# Patient Record
Sex: FEMALE | Employment: STUDENT | URBAN - METROPOLITAN AREA
[De-identification: names, ages, dates, MRNs, and addresses within clinical notes are randomized per-mention and may not be internally consistent; named-entity substitution may affect disease eponyms.]

---

## 2017-05-19 PROBLEM — R48.2 GAIT APRAXIA: Status: ACTIVE | Noted: 2017-05-19

## 2017-05-19 PROBLEM — G35 MULTIPLE SCLEROSIS (HCC): Status: ACTIVE | Noted: 2017-05-19

## 2017-06-05 ENCOUNTER — APPOINTMENT (OUTPATIENT)
Dept: PHYSICAL THERAPY | Age: 22
End: 2017-06-05
Attending: PSYCHIATRY & NEUROLOGY

## 2017-12-14 PROBLEM — G35 MULTIPLE SCLEROSIS (HCC): Status: RESOLVED | Noted: 2017-05-19 | Resolved: 2017-12-14

## 2017-12-29 PROBLEM — R29.3 POSTURAL IMBALANCE: Status: ACTIVE | Noted: 2017-12-29

## 2017-12-29 PROBLEM — Z91.199 NON-COMPLIANCE WITH TREATMENT: Status: ACTIVE | Noted: 2017-12-29

## 2018-01-08 ENCOUNTER — APPOINTMENT (OUTPATIENT)
Dept: PHYSICAL THERAPY | Age: 23
End: 2018-01-08
Attending: PSYCHIATRY & NEUROLOGY
Payer: COMMERCIAL

## 2018-01-09 ENCOUNTER — HOSPITAL ENCOUNTER (OUTPATIENT)
Dept: PHYSICAL THERAPY | Age: 23
Discharge: HOME | End: 2018-01-09
Attending: PSYCHIATRY & NEUROLOGY
Payer: COMMERCIAL

## 2018-01-09 DIAGNOSIS — G35 MULTIPLE SCLEROSIS (HCC): ICD-10-CM

## 2018-01-09 PROCEDURE — 97162 PT EVAL MOD COMPLEX 30 MIN: CPT

## 2018-01-09 NOTE — THERAPY EVALUATION
Raymundo Hand  : 1995  Primary: Mirta Hassan [facility]  Secondary:  [address]  at [facility]  [address] [place], [address] [place] [address], [place] [address] [address] [facility]  [phone]   [phone]        OUTPATIENT PHYSICAL THERAPY:Initial Assessment 2018    ICD-10: Treatment Diagnosis: Muscle weakness (generalized) (M62.81); Other abnormalities of gait and mobility (R26.89); Other disturbances of skin sensation (R20.8)  Precautions/Allergies:   Review of patient's allergies indicates no known allergies. Fall Risk Score: 6 (? 5 = High Risk)  MD Orders: PT eval and mgt MEDICAL/REFERRING DIAGNOSIS:  Multiple sclerosis ([address] Lysbeth Gene   DATE OF ONSET:   REFERRING PHYSICIAN: Shar Dumont MD  RETURN PHYSICIAN APPOINTMENT: unknown  DATE OF PROGRESS NOTE:    RECERTIFICATION DATE: 02     INITIAL ASSESSMENT:  Ms. Mildred De Jesus presents with weakness, sensory loss, incoordination, balance and gait deficit in the presence of an MS diagnosis. Pt will benefit from physical therapy/acquatics to maximize functional ability including any equipments needs assessment. Pt would benefit from  consult for community assistance needs. Pt may benefit from hand controls for driving to further her independence. PROBLEM LIST (Impacting functional limitations):  1. Decreased Strength  2. Decreased ADL/Functional Activities  3. Decreased Transfer Abilities  4. Decreased Ambulation Ability/Technique  5. Decreased Balance  6. Increased Pain  7. Decreased Activity Tolerance  8. Increased Fatigue  9. Decreased Flexibility/Joint Mobility  10. Decreased [place] with Home Exercise Program INTERVENTIONS PLANNED:  1. Balance Exercise  2. Bed Mobility  3. Electrical Stimulation  4. Family Education  5. Gait Training  6. Home Exercise Program (HEP)  7. Manual Therapy  8. Neuromuscular Re-education/Strengthening  9. Range of Motion (ROM)  10. Therapeutic Activites  11.  Therapeutic Exercise/Strengthening  12. Transfer Training  13. orthotic consult   14. W/c assessment when needed   TREATMENT PLAN:  Effective Dates: 1/9/18 TO 4/6/18. Frequency/Duration: 2 times a week for 12 weeks  GOALS: (Goals have been discussed and agreed upon with patient.)  Short-Term Functional Goals: Time Frame: 4 weeks  1. Pt will be independent with range of motion, strength and balance home exercise program.  2. Pt will be able to stand x 2 minutes with good posture - not locking knees in extension entire time. 3. Pt will decrease TUG time x 2 seconds indicating increased strength, balance and gait ability. Discharge Goals: Time Frame: 12 weeks  Pt will show increased range of motion, strength and improved posture to allow for improved safety and ability with all functional mobility. Pt will decrease TUG score by 2 more seconds indicating more normalized gait pattern and decrease risk for falls. Pt will increase Moraes Balance Scale to 46/56 indicating increased balance and decreased risk for falls. Pt will be able to ambulate independent and safe with least restrictive device to test for 6 minute walk test.    Rehabilitation Potential For Stated Goals: Good  Regarding Jeanie Donis's therapy, I certify that the treatment plan above will be carried out by a therapist or under their direction. Thank you for this referral,  Rogelio Dunaway PT     Referring Physician Signature: Lelo Martínez MD              Date                    HISTORY:   GOAL:  \"I want to be able to run. I have tired to run when this first happened. I can do a plank. Be able to walk without assistance. Present Symptoms:    23year old right handed black female diagnosed with MS 11/2015. Noticed abnormalities since 2004. Sensitive to heat and cold. Still getting used to the weather and how it affects it. When it is rainy and really cold I feel like bad rusted wire mostly in my legs.  I don't get too much discofmort in my arms. While dealing with this I am online to go to school. Studying  [email or web address]. Started 2004 feeling bad and have not worked since then. I would work out and then my workout were not coming along like going up the steps was my first initial clue that something was wrong. Cant pick stuff up from the floor especially on steps. Lives with mom Alondra Giles - dresssing . Use a shower chair and then is independent. [address] and has cane. Has a wheelchair. Furniture or wall walk at home. Wheelchair walker or hold someone when out. About 3 falls per month. Pt, mom, sister and nephew. Boyfriend Vivia Olszewski. Helps patient - dating  About 1 year. Mom gone during the day. Microwave food. Decreased memory. Dont really cook on stove anymore because I forgot it one time  Daily routine - make oatmeal, check out classroom and a little work. Pain in my legs or joints get some stinging and cant sleep    Do steps - like go up and down 5 times. PAIN:  Pain with exhuastion or cold or relapse and puts her in the bed.  5/10 overall  Has not told MD about pain. Has had a 1/2 Lortab before and that helped. Baclofen was no good. Cold weather is a set back but mobility is about where it has been. History of Present Injury/Illness (Reason for Referral):    Past Medical History/Comorbidities:   Ms. Brenton Young  has a past medical history of Herpes genitalia; HSV (herpes simplex virus) anogenital infection (12/22/2015); and [facility] Ms. Brenton Young  has no past surgical history on file. Social History/Living Environment:     Two story home. Room upstairs - Hold banister and wall for steps. Prior Level of Function/Work/Activity:  2004 not working.     Current Medications:    Current Outpatient Prescriptions:     VITAMIN B COMPLEX PO, Take  by mouth., Disp: , Rfl:     VITAMIN E ACETATE PO, Take  by mouth., Disp: , Rfl:     amoxicillin (AMOXIL) 500 mg capsule, Take 1 Cap by mouth two (2) times a day., Disp: 20 Cap, Rfl: 0    Brompheniramine-Pseudoeph-DM (BROMFED DM) 2-30-10 mg/5 mL syrup, Take 5 mL by mouth four (4) times daily as needed. , Disp: 200 mL, Rfl: 0    methylPREDNISolone (MEDROL DOSEPACK) 4 mg tablet, Take 1 Tab by mouth Specific Days and Specific Times. , Disp: 1 Dose Pack, Rfl: 0    Wheel Chair cesar, Daily use G35 & R 48.2, Disp: 1 Each, Rfl: 0    AMPYRA 10 mg Tb12, , Disp: , Rfl:     desogestrel-ethinyl estradiol (ENSKYCE) 0.15-0.03 mg tab, Take 1 Tab by mouth daily. , Disp: 3 Dose Pack, Rfl: 4    valACYclovir (VALTREX) 1 gram tablet, Take 1 Tab by mouth daily. , Disp: 30 Tab, Rfl: 11    DISABLED PLACARD (DISABLED PLACARD) DMV, Permanent Handicap Placard DX: Multiple Sclerosis G35, Disp: 1 Each, Rfl: 0   Date Last Reviewed:  2018   Number of Personal Factors/Comorbidities that affect the Plan of Care: 1-2: MODERATE COMPLEXITY   EXAMINATION:   ROM:  UE:  Good flexible. Light touch present through out LEs, but delayed responses. UE good. Decreased proprioception noted with eyes closed  Strength:          UE:  4+ to 5/5. Core fair static - dynamic could not reach and leave LORI  Left LE:    Hip flexion:  occ 3/5 but usually 3-/5. About 1/2 range  Quickly fatigues  Knee ext: 5/5  Hamstrin/5  DF:  Slight decreased range:  3+/5 EV/INV: 5/5  Toes:  1/2 range for extension  Fairly strong in PF    Right:  Hip flexion 1/4 range in sitting  Knee ext: 5/5  Hams:  4/5  DF:  1/2 range  EV:  1/2 range  INV:  5/5  Toes as above  Functional Mobility:         Gait/Ambulation:  Pt typically walks with walls and furniture or someone helping her or her walker. If the distance is longer she uses the wheelchair. Today without assistive device with close supervision and the wall nearby pt ambulates at least 100' with crouched ataxic gait with scissoring, path deviations and arms in mid guard reaching out to catch a fall. Decreased WS in stance over either foot.         Transfers:  Independent with use of hands        Bed Mobility:  independent  Mental Status:          Alert and oriented x 4. Self reported forgetfulness. Slow responses with mildly perceptible slur. Vision:          Pt states she has no deficits. Does get dizzy with turns. Body Structures Involved:  1. Nerves  2. Eyes and Ears  3. Voice/Speech  4. Bones  5. Joints  6. Muscles  7. Ligaments Body Functions Affected:  1. Mental  2. Sensory/Pain  3. Voice and Speech  4. Neuromusculoskeletal  5. Movement Related Activities and Participation Affected:  1. Learning and Applying Knowledge  2. General Tasks and Demands  3. Communication  4. Mobility  5. Self Care  6. Domestic Life  7. Interpersonal Interactions and Relationships  8. Community, Social and [place]   Number of elements that affect the Plan of Care: 4+: HIGH COMPLEXITY   CLINICAL PRESENTATION:   Presentation: Evolving clinical presentation with changing clinical characteristics: MODERATE COMPLEXITY   CLINICAL DECISION MAKING:   Outcome Measure: Tool Used: Moraes Balance Scale  Score:  Initial: 35/56 Most Recent: X/56 (Date: -- )   Interpretation of Score: Each section is scored on a 0-4 scale, 0 representing the patients inability to perform the task and 4 representing independence. The scores of each section are added together for a total score of 56. The higher the patients score, the more independent the patient is. Any score below 45 indicates increased risk for falls. Score 56 55-45 44-34 33-23 22-12 11-1 0   Modifier CH CI CJ CK CL CM CN     Tool Used: Timed Up and Go (TUG)  Score:  Initial: 16.45 seconds Most Recent: X seconds (Date: -- )   Interpretation of Score: The test measures, in seconds, the time taken by an individual to stand up from a standard arm chair (seat height 46 cm [18 in], arm height 65 cm [25.6 in]), walk a distance of 3 meters (118 in, approx 10 ft), turn, walk back to the chair and sit down.   If the individual takes longer than 14 seconds to complete TUG, this indicates risk for falls. Score 7 7.5-10.5 11-14 14.5-17.5 18-21 21.5-24.5 25+   Modifier CH CI CJ CK CL CM CN     Medical Necessity:   · Skilled intervention continues to be required due to weakness, incoordination, balance and gait deficit. .  Reason for Services/Other Comments:  · Patient continues to require skilled intervention due to weakness, incoordination, balance and gait deficit. .   Use of outcome tool(s) and clinical judgement create a POC that gives a: Questionable prediction of patient's progress: MODERATE COMPLEXITY   TREATMENT:   (In addition to Assessment/Re-Assessment sessions the following treatments were rendered)    ASSESSMENT    Treatment/Session Assessment:  See initial assessment above. Patients response to todays treatment session was tolerated well with no medical complications. .  · Post session pain:  NO CHANGE IN PAIN  · Compliance with Program/Exercises: Will assess as treatment progresses. · Recommendations/Intent for next treatment session: \"Next visit will focus on full range, coordiantion, balance and gait exercises. \".   Total Treatment Duration:       Shama Carrasco PT

## 2018-01-09 NOTE — PROGRESS NOTES
Ambulatory/Rehab Services H2 Model Falls Risk Assessment    Risk Factor Pts. ·   Confusion/Disorientation/Impulsivity  []    4 ·   Symptomatic Depression  []   2 ·   Altered Elimination  []   1 ·   Dizziness/Vertigo  []   1 ·   Gender (Male)  []   1 ·   Any administered antiepileptics (anticonvulsants):  []   2 ·   Any administered benzodiazepines:  []   1 ·   Visual Impairment (specify):  []   1 ·   Portable Oxygen Use  []   1 ·   Orthostatic ? BP  []   1 ·   History of Recent Falls (within 3 mos.)  [x]   5     Ability to Rise from Chair (choose one) Pts. ·   Ability to rise in a single movement  []   0 ·   Pushes up, successful in one attempt  [x]   1 ·   Multiple attempts, but successful  []   3 ·   Unable to rise without assistance  []   4   Total: (5 or greater = High Risk) 6     Falls Prevention Plan:   []                Physical Limitations to Exercise (specify):   []                Mobility Assistance Device (type):   []                Exercise/Equipment Adaptation (specify):    ©2010 [facility] Patent #[id number].  Federal Law prohibits the replication, distribution or use without written permission from [facility]

## 2018-01-16 ENCOUNTER — HOSPITAL ENCOUNTER (OUTPATIENT)
Dept: PHYSICAL THERAPY | Age: 23
Discharge: HOME | End: 2018-01-16
Payer: COMMERCIAL

## 2018-01-16 PROCEDURE — 97113 AQUATIC THERAPY/EXERCISES: CPT

## 2018-01-16 NOTE — PROGRESS NOTES
Florinda Chico  : 1995  Primary: [employer]  Secondary:  [address]  at [facility]  [address], [place], [facility]  [phone]   [phone]        OUTPATIENT PHYSICAL THERAPY:Daily Note and Aquatic Note 2018    ICD-10: Treatment Diagnosis: Muscle weakness (generalized) (M62.81); Other abnormalities of gait and mobility (R26.89); Other disturbances of skin sensation (R20.8)  Precautions/Allergies:   Review of patient's allergies indicates no known allergies. Fall Risk Score: 6 (? 5 = High Risk)  MD Orders: PT eval and mgt MEDICAL/REFERRING DIAGNOSIS:  Multiple sclerosis [G35]   DATE OF ONSET:   REFERRING PHYSICIAN: Yinka Paz MD  RETURN PHYSICIAN APPOINTMENT: unknown  DATE OF PROGRESS NOTE:  23  RECERTIFICATION DATE: 99     INITIAL ASSESSMENT:  Ms. Seun Cardenas presents with weakness, sensory loss, incoordination, balance and gait deficit in the presence of an MS diagnosis. Pt will benefit from physical therapy/acquatics to maximize functional ability including any equipments needs assessment. Pt would benefit from  consult for community assistance needs. Pt may benefit from hand controls for driving to further her independence. PROBLEM LIST (Impacting functional limitations):  1. Decreased Strength  2. Decreased ADL/Functional Activities  3. Decreased Transfer Abilities  4. Decreased Ambulation Ability/Technique  5. Decreased Balance  6. Increased Pain  7. Decreased Activity Tolerance  8. Increased Fatigue  9. Decreased Flexibility/Joint Mobility  10. Decreased [place] with Home Exercise Program INTERVENTIONS PLANNED:  1. Balance Exercise  2. Bed Mobility  3. Electrical Stimulation  4. Family Education  5. Gait Training  6. Home Exercise Program (HEP)  7. Manual Therapy  8. Neuromuscular Re-education/Strengthening  9. Range of Motion (ROM)  10. Therapeutic Activites  11.  Therapeutic Exercise/Strengthening  12. Transfer Training  13. orthotic consult   14. W/c assessment when needed   TREATMENT PLAN:  Effective Dates: 1/9/18 TO 4/6/18. Frequency/Duration: 2 times a week for 12 weeks  GOALS: (Goals have been discussed and agreed upon with patient.)  Short-Term Functional Goals: Time Frame: 4 weeks  1. Pt will be independent with range of motion, strength and balance home exercise program.  2. Pt will be able to stand x 2 minutes with good posture - not locking knees in extension entire time. 3. Pt will decrease TUG time x 2 seconds indicating increased strength, balance and gait ability. Discharge Goals: Time Frame: 12 weeks  Pt will show increased range of motion, strength and improved posture to allow for improved safety and ability with all functional mobility. Pt will decrease TUG score by 2 more seconds indicating more normalized gait pattern and decrease risk for falls. Pt will increase Moraes Balance Scale to 46/56 indicating increased balance and decreased risk for falls. Pt will be able to ambulate independent and safe with least restrictive device to test for 6 minute walk test.    Rehabilitation Potential For Stated Goals: Good  Regarding Jeanie Donis's therapy, I certify that the treatment plan above will be carried out by a therapist or under their direction. Thank you for this referral,  Melissa Rees PTA     Referring Physician Signature: Paco Escalera MD              Date                    HISTORY:   GOAL:  \"I want to be able to run. I have tired to run when this first happened. I can do a plank. Be able to walk without assistance. Present Symptoms:    23year old right handed black female diagnosed with MS 11/2015. Noticed abnormalities since 2004. Sensitive to heat and cold. Still getting used to the weather and how it affects it. When it is rainy and really cold I feel like bad rusted wire mostly in my legs.  I don't get too much discofmort in my arms. While dealing with this I am online to go to school. Studying  [employer]. Started 2004 feeling bad and have not worked since then. I would work out and then my workout were not coming along like going up the steps was my first initial clue that something was wrong. Cant pick stuff up from the floor especially on steps. Lives with mom Laina Kraus - dresssing . Use a shower chair and then is independent. Doming[place] Aloe and has cane. Has a wheelchair. Furniture or wall walk at home. Wheelchair walker or hold someone when out. About 3 falls per month. Pt, mom, sister and nephew. Boyfriend Kaylee Lanier. Helps patient - dating  About 1 year. Mom gone during the day. Microwave food. Decreased memory. Dont really cook on stove anymore because I forgot it one time  Daily routine - make oatmeal, check out classroom and a little work. Pain in my legs or joints get some stinging and cant sleep    Do steps - like go up and down 5 times. PAIN:  Pain with exhuastion or cold or relapse and puts her in the bed.  5/10 overall  Has not told MD about pain. Has had a 1/2 Lortab before and that helped. Baclofen was no good. Cold weather is a set back but mobility is about where it has been. History of Present Injury/Illness (Reason for Referral):    Past Medical History/Comorbidities:   Ms. Ilir Mckeon  has a past medical history of Herpes genitalia; HSV (herpes simplex virus) anogenital infection (12/22/2015); and MS (multiple sclerosis) ([facility] Ms. Ilir Mckeon  has no past surgical history on file. Social History/Living Environment:     Two story home. Room upstairs - Hold banister and wall for steps. Prior Level of Function/Work/Activity:  2004 not working. Current Medications:    Current Outpatient Prescriptions:     Cholecalciferol, Vitamin D3, 3,000 unit tab, Take  by mouth., Disp: , Rfl:     metroNIDAZOLE (METROGEL) 0.75 % vaginal gel, Insert 1 Applicator into vagina nightly for 5 days. , Disp: 187.5 mg, Rfl: 0    VITAMIN B COMPLEX PO, Take  by mouth., Disp: , Rfl:     VITAMIN E ACETATE PO, Take  by mouth., Disp: , Rfl:     Wheel Chair cesar, Daily use G35 & R 48.2, Disp: 1 Each, Rfl: 0    AMPYRA 10 mg Tb12, , Disp: , Rfl:     desogestrel-ethinyl estradiol (ENSKYCE) 0.15-0.03 mg tab, Take 1 Tab by mouth daily. , Disp: 3 Dose Pack, Rfl: 4    valACYclovir (VALTREX) 1 gram tablet, Take 1 Tab by mouth daily. , Disp: 30 Tab, Rfl: 11    DISABLED PLACARD (DISABLED PLACARD) DMV, Permanent Handicap Placard DX: Multiple Sclerosis G35, Disp: 1 Each, Rfl: 0   Date Last Reviewed:  2018   Number of Personal Factors/Comorbidities that affect the Plan of Care: 1-2: MODERATE COMPLEXITY   EXAMINATION:   ROM:  UE:  Good flexible. Light touch present through out LEs, but delayed responses. UE good. Decreased proprioception noted with eyes closed  Strength:          UE:  4+ to 5/5. Core fair static - dynamic could not reach and leave LORI  Left LE:    Hip flexion:  occ 3/5 but usually 3-/5. About 1/2 range  Quickly fatigues  Knee ext: 5/5  Hamstrin/5  DF:  Slight decreased range:  3+/5 EV/INV: 5/5  Toes:  1/2 range for extension  Fairly strong in PF    Right:  Hip flexion 1/4 range in sitting  Knee ext: 5/5  Hams:  4/5  DF:  1/2 range  EV:  1/2 range  INV:  5/5  Toes as above  Functional Mobility:         Gait/Ambulation:  Pt typically walks with walls and furniture or someone helping her or her walker. If the distance is longer she uses the wheelchair. Today without assistive device with close supervision and the wall nearby pt ambulates at least 100' with crouched ataxic gait with scissoring, path deviations and arms in mid guard reaching out to catch a fall. Decreased WS in stance over either foot. Transfers:  Independent with use of hands        Bed Mobility:  independent  Mental Status:          Alert and oriented x 4. Self reported forgetfulness. Slow responses with mildly perceptible slur. Vision:          Pt states she has no deficits. Does get dizzy with turns. Body Structures Involved:  1. Nerves  2. Eyes and Ears  3. Voice/Speech  4. Bones  5. Joints  6. Muscles  7. Ligaments Body Functions Affected:  1. Mental  2. Sensory/Pain  3. Voice and Speech  4. Neuromusculoskeletal  5. Movement Related Activities and Participation Affected:  1. Learning and Applying Knowledge  2. General Tasks and Demands  3. Communication  4. Mobility  5. Self Care  6. Domestic Life  7. Interpersonal Interactions and Relationships  8. Community, Social and [place]   Number of elements that affect the Plan of Care: 4+: HIGH COMPLEXITY   CLINICAL PRESENTATION:   Presentation: Evolving clinical presentation with changing clinical characteristics: MODERATE COMPLEXITY   CLINICAL DECISION MAKING:   Outcome Measure: Tool Used: Moraes Balance Scale  Score:  Initial: 35/56 Most Recent: X/56 (Date: -- )   Interpretation of Score: Each section is scored on a 0-4 scale, 0 representing the patients inability to perform the task and 4 representing independence. The scores of each section are added together for a total score of 56. The higher the patients score, the more independent the patient is. Any score below 45 indicates increased risk for falls. Score 56 55-45 44-34 33-23 22-12 11-1 0   Modifier CH CI CJ CK CL CM CN     Tool Used: Timed Up and Go (TUG)  Score:  Initial: 16.45 seconds Most Recent: X seconds (Date: -- )   Interpretation of Score: The test measures, in seconds, the time taken by an individual to stand up from a standard arm chair (seat height 46 cm [18 in], arm height 65 cm [25.6 in]), walk a distance of 3 meters (118 in, approx 10 ft), turn, walk back to the chair and sit down. If the individual takes longer than 14 seconds to complete TUG, this indicates risk for falls.   Score 7 7.5-10.5 11-14 14.5-17.5 18-21 21.5-24.5 25+   Modifier CH CI CJ CK CL CM CN     Medical Necessity:   · Skilled intervention continues to be required due to weakness, incoordination, balance and gait deficit. .  Reason for Services/Other Comments:  · Patient continues to require skilled intervention due to weakness, incoordination, balance and gait deficit. .   Use of outcome tool(s) and clinical judgement create a POC that gives a: Questionable prediction of patient's progress: MODERATE COMPLEXITY   TREATMENT:     Aquatic Therapy (40 minutes): Aquatic treatment performed per flow grid for Decreased muscle strength, Decreased endurance and Decreased static/dynamic balance and reactive control. Cues provided for balance. Assistance by therapist provided for balance and safety.      Aquatic Exercise Log       Date  1-16-18 Date   Date   Date   Date     Activity/ Exercise Parameters Parameters Parameters Parameters Parameters   Walking forward 4 laps with rail        Walking backward 4 laps with rail       Walking sideways 4 laps with rail         Marching 2 laps with rail and HHA         Goose Step 2 laps with rail and HHA         Tip toes 2 laps with rail and HHA         Heels          Lunges        Side step squats        LE Exercises No noodle         Hip Flex/Ext X 10 B         Hip Abd/Add X 10 B         Hip IR/ER          Calf raises X 10 B         Knee Flex X 10 B         Squats          Leg Circles Deeper water x 10 each way each leg         Step Ups        UE Exercises          Squeeze In          Push Down          Pull Down          Bicep/Tricep        Rows/Press outs         Chi Positions          Trunk Rotation        Deep H2O/ Noodles rings         Stabilization          Arms only          Legs only Bicycle - 2 laps       Cross   [place] X 20          Scissors X 20   Combo x 10          Crab walk        Lower abdominal   work  Knees to chest x 10          Cardio          Jogging        Lap   Swimming          Stretches On bench          Hamstrings X 3 B         Heelcords X 3 B         Piriformis X 3 B         Neck Treatment/Session Assessment:  Patient apologized for being so late. She did well with her first aquatic session. She requires cues to remain on task and to stay focused on task. No pain before or after therapy session. She only reported being tired after session. Patients response to todays treatment session was tolerated well with no medical complications. .  · Post session pain:  NO CHANGE IN PAIN  · Compliance with Program/Exercises: Will assess as treatment progresses. · Recommendations/Intent for next treatment session: \"Next visit will focus on full range, coordiantion, balance and gait exercises. \".   Total Treatment Duration:  PT Patient Time In/Time Out  Time In: 0825 (Patient 25 minutes late)  Time Out: [address], PTA

## 2018-01-17 ENCOUNTER — HOSPITAL ENCOUNTER (OUTPATIENT)
Dept: MRI IMAGING | Age: 23
Discharge: HOME | End: 2018-01-17
Attending: PSYCHIATRY & NEUROLOGY

## 2018-01-18 ENCOUNTER — HOSPITAL ENCOUNTER (OUTPATIENT)
Dept: PHYSICAL THERAPY | Age: 23
Discharge: HOME | End: 2018-01-18
Attending: PSYCHIATRY & NEUROLOGY
Payer: COMMERCIAL

## 2018-01-18 NOTE — PROGRESS NOTES
Percilla Dancer  : 1995  Primary: [facility]  Secondary:  [address]  at [facility], [address], [address]  [phone]   [phone]      2018  Appointment cancelled 2* to inclement weather.   Amarilis Leigh, PT

## 2018-01-23 ENCOUNTER — HOSPITAL ENCOUNTER (OUTPATIENT)
Dept: PHYSICAL THERAPY | Age: 23
Discharge: HOME | End: 2018-01-23
Payer: COMMERCIAL

## 2018-01-23 PROCEDURE — 97113 AQUATIC THERAPY/EXERCISES: CPT

## 2018-01-23 NOTE — PROGRESS NOTES
Herson Meyer  : 1995  Primary: [facility]  Secondary:  [address]  at [facility]  [address] [facility], [address] [place] [address] Drive, [place], [facility]  [phone]   [phone]        OUTPATIENT PHYSICAL THERAPY:Daily Note and Aquatic Note 2018    ICD-10: Treatment Diagnosis: Muscle weakness (generalized) (M62.81); Other abnormalities of gait and mobility (R26.89); Other disturbances of skin sensation (R20.8)  Precautions/Allergies:   Review of patient's allergies indicates no known allergies. Fall Risk Score: 6 (? 5 = High Risk)  MD Orders: PT eval and mgt MEDICAL/REFERRING DIAGNOSIS:  Multiple sclerosis [G35]   DATE OF ONSET:   REFERRING PHYSICIAN: River Bahena MD  RETURN PHYSICIAN APPOINTMENT: unknown  DATE OF PROGRESS NOTE:  69  RECERTIFICATION DATE: 3/2/32     INITIAL ASSESSMENT:  Ms. Jacqueline Millan presents with weakness, sensory loss, incoordination, balance and gait deficit in the presence of an MS diagnosis. Pt will benefit from physical therapy/acquatics to maximize functional ability including any equipments needs assessment. Pt would benefit from  consult for community assistance needs. Pt may benefit from hand controls for driving to further her independence. PROBLEM LIST (Impacting functional limitations):  1. Decreased Strength  2. Decreased ADL/Functional Activities  3. Decreased Transfer Abilities  4. Decreased Ambulation Ability/Technique  5. Decreased Balance  6. Increased Pain  7. Decreased Activity Tolerance  8. Increased Fatigue  9. Decreased Flexibility/Joint Mobility  10. Decreased [place] with Home Exercise Program INTERVENTIONS PLANNED:  1. Balance Exercise  2. Bed Mobility  3. Electrical Stimulation  4. Family Education  5. Gait Training  6. Home Exercise Program (HEP)  7. Manual Therapy  8. Neuromuscular Re-education/Strengthening  9. Range of Motion (ROM)  10. Therapeutic Activites  11.  Therapeutic Exercise/Strengthening  12. Transfer Training  13. orthotic consult   14. W/c assessment when needed   TREATMENT PLAN:  Effective Dates: 1/9/18 TO 4/6/18. Frequency/Duration: 2 times a week for 12 weeks  GOALS: (Goals have been discussed and agreed upon with patient.)  Short-Term Functional Goals: Time Frame: 4 weeks  1. Pt will be independent with range of motion, strength and balance home exercise program.  2. Pt will be able to stand x 2 minutes with good posture - not locking knees in extension entire time. 3. Pt will decrease TUG time x 2 seconds indicating increased strength, balance and gait ability. Discharge Goals: Time Frame: 12 weeks  Pt will show increased range of motion, strength and improved posture to allow for improved safety and ability with all functional mobility. Pt will decrease TUG score by 2 more seconds indicating more normalized gait pattern and decrease risk for falls. Pt will increase Moraes Balance Scale to 46/56 indicating increased balance and decreased risk for falls. Pt will be able to ambulate independent and safe with least restrictive device to test for 6 minute walk test.    Rehabilitation Potential For Stated Goals: Good  Regarding Jeanie Donis's therapy, I certify that the treatment plan above will be carried out by a therapist or under their direction. Thank you for this referral,  Rodríguez Gunn PTA     Referring Physician Signature: Lennox Rink, MD              Date                    HISTORY:   GOAL:  \"I want to be able to run. I have tired to run when this first happened. I can do a plank. Be able to walk without assistance. Present Symptoms:    23year old right handed black female diagnosed with MS 11/2015. Noticed abnormalities since 2004. Sensitive to heat and cold. Still getting used to the weather and how it affects it. When it is rainy and really cold I feel like bad rusted wire mostly in my legs.  I don't get too much discofmort in my arms. While dealing with this I am online to go to school. Studying  [employer]. Started 2004 feeling bad and have not worked since then. I would work out and then my workout were not coming along like going up the steps was my first initial clue that something was wrong. Cant pick stuff up from the floor especially on steps. Lives with mom Norma Cant - dresssing . Use a shower chair and then is independent. [employer] and has cane. Has a wheelchair. Furniture or wall walk at home. Wheelchair walker or hold someone when out. About 3 falls per month. Pt, mom, sister and nephew. Boyfriend Lynsey Garcia. Helps patient - dating  About 1 year. Mom gone during the day. Microwave food. Decreased memory. Dont really cook on stove anymore because I forgot it one time  Daily routine - make oatmeal, check out classroom and a little work. Pain in my legs or joints get some stinging and cant sleep    Do steps - like go up and down 5 times. PAIN:  Pain with exhuastion or cold or relapse and puts her in the bed.  5/10 overall  Has not told MD about pain. Has had a 1/2 Lortab before and that helped. Baclofen was no good. Cold weather is a set back but mobility is about where it has been. History of Present Injury/Illness (Reason for Referral):    Past Medical History/Comorbidities:   Ms. Evaristo Chang  has a past medical history of Herpes genitalia; HSV (herpes simplex virus) anogenital infection (12/22/2015); and [facility] Ms. Evaristo Chang  has no past surgical history on file. Social History/Living Environment:     Two story home. Room upstairs - Hold banister and wall for steps. Prior Level of Function/Work/Activity:  2004 not working.     Current Medications:    Current Outpatient Prescriptions:     Cholecalciferol, Vitamin D3, 3,000 unit tab, Take  by mouth., Disp: , Rfl:     VITAMIN B COMPLEX PO, Take  by mouth., Disp: , Rfl:     VITAMIN E ACETATE PO, Take  by mouth., Disp: , Rfl:     Wheel Chair cesar, Daily use G35 & R 48.2, Disp: 1 Each, Rfl: 0    AMPYRA 10 mg Tb12, , Disp: , Rfl:     desogestrel-ethinyl estradiol (ENSKYCE) 0.15-0.03 mg tab, Take 1 Tab by mouth daily. , Disp: 3 Dose Pack, Rfl: 4    valACYclovir (VALTREX) 1 gram tablet, Take 1 Tab by mouth daily. , Disp: 30 Tab, Rfl: 11    DISABLED PLACARD (DISABLED PLACARD) DMV, Permanent Handicap Placard DX: Multiple Sclerosis G35, Disp: 1 Each, Rfl: 0   Date Last Reviewed:  2018   Number of Personal Factors/Comorbidities that affect the Plan of Care: 1-2: MODERATE COMPLEXITY   EXAMINATION:   ROM:  UE:  Good flexible. Light touch present through out LEs, but delayed responses. UE good. Decreased proprioception noted with eyes closed  Strength:          UE:  4+ to 5/5. Core fair static - dynamic could not reach and leave LORI  Left LE:    Hip flexion:  occ 3/5 but usually 3-/5. About 1/2 range  Quickly fatigues  Knee ext: 5/5  Hamstrin/5  DF:  Slight decreased range:  3+/5 EV/INV: 5/5  Toes:  1/2 range for extension  Fairly strong in PF    Right:  Hip flexion 1/4 range in sitting  Knee ext: 5/5  Hams:  4/5  DF:  1/2 range  EV:  1/2 range  INV:  5/5  Toes as above  Functional Mobility:         Gait/Ambulation:  Pt typically walks with walls and furniture or someone helping her or her walker. If the distance is longer she uses the wheelchair. Today without assistive device with close supervision and the wall nearby pt ambulates at least 100' with crouched ataxic gait with scissoring, path deviations and arms in mid guard reaching out to catch a fall. Decreased WS in stance over either foot. Transfers:  Independent with use of hands        Bed Mobility:  independent  Mental Status:          Alert and oriented x 4. Self reported forgetfulness. Slow responses with mildly perceptible slur. Vision:          Pt states she has no deficits. Does get dizzy with turns. Body Structures Involved:  1. Nerves  2.  Eyes and Ears  3. Voice/Speech  4. Bones  5. Joints  6. Muscles  7. Ligaments Body Functions Affected:  1. Mental  2. Sensory/Pain  3. Voice and Speech  4. Neuromusculoskeletal  5. Movement Related Activities and Participation Affected:  1. Learning and Applying Knowledge  2. General Tasks and Demands  3. Communication  4. Mobility  5. Self Care  6. Domestic Life  7. Interpersonal Interactions and Relationships  8. Community, Social and [place]   Number of elements that affect the Plan of Care: 4+: HIGH COMPLEXITY   CLINICAL PRESENTATION:   Presentation: Evolving clinical presentation with changing clinical characteristics: MODERATE COMPLEXITY   CLINICAL DECISION MAKING:   Outcome Measure: Tool Used: Moraes Balance Scale  Score:  Initial: 35/56 Most Recent: X/56 (Date: -- )   Interpretation of Score: Each section is scored on a 0-4 scale, 0 representing the patients inability to perform the task and 4 representing independence. The scores of each section are added together for a total score of 56. The higher the patients score, the more independent the patient is. Any score below 45 indicates increased risk for falls. Score 56 55-45 44-34 33-23 22-12 11-1 0   Modifier CH CI CJ CK CL CM CN     Tool Used: Timed Up and Go (TUG)  Score:  Initial: 16.45 seconds Most Recent: X seconds (Date: -- )   Interpretation of Score: The test measures, in seconds, the time taken by an individual to stand up from a standard arm chair (seat height 46 cm [18 in], arm height 65 cm [25.6 in]), walk a distance of 3 meters (118 in, approx 10 ft), turn, walk back to the chair and sit down. If the individual takes longer than 14 seconds to complete TUG, this indicates risk for falls. Score 7 7.5-10.5 11-14 14.5-17.5 18-21 21.5-24.5 25+   Modifier CH CI CJ CK CL CM CN     Medical Necessity:   · Skilled intervention continues to be required due to weakness, incoordination, balance and gait deficit. .  Reason for Services/Other Comments:  · Patient continues to require skilled intervention due to weakness, incoordination, balance and gait deficit. .   Use of outcome tool(s) and clinical judgement create a POC that gives a: Questionable prediction of patient's progress: MODERATE COMPLEXITY   TREATMENT:  S: Patient reports feeling good and strong after her last session. She reports she was able to walk better and felt more stable afterwards. No pain or fatigue is reported. Aquatic Therapy (55 minutes): Aquatic treatment performed per flow grid for Decreased muscle strength, Decreased endurance and Decreased static/dynamic balance and reactive control. Cues provided for balance. Assistance by therapist provided for balance and safety.      Aquatic Exercise Log       Date  1-16-18 Date  1-23-18 Date   Date   Date     Activity/ Exercise Parameters Parameters Parameters Parameters Parameters   Walking forward 4 laps with rail  4 laps with rail      Walking backward 4 laps with rail 4 laps with rail      Walking sideways 4 laps with rail 4 laps with rail        Marching 2 laps with rail and HHA 4 laps with rail         Goose Step 2 laps with rail and HHA 2 laps with rail         Tip toes 2 laps with rail and HHA         Heels          Lunges        Side step squats        LE Exercises No noodle         Hip Flex/Ext X 10 B         Hip Abd/Add X 10 B         Hip IR/ER          Calf raises X 10 B         Knee Flex X 10 B         Squats  With bungee belt pulling in all directions x 10 with emphasis on core and stability  (light pull)        Leg Circles Deeper water x 10 each way each leg         Step Ups  X 10 small step B   X 5 big step B       UE Exercises          Squeeze In          Push Down          Pull Down          Bicep/Tricep        Rows/Press outs         Chi Positions          Trunk Rotation        Deep H2O/ Noodles rings rings        Stabilization  Core stability        Arms only          Legs only Bicycle - 2 laps Bicycle - Also seated on noodle in shallow - 4 laps forward and [place] X 20  X 20         Scissors X 20   Combo x 10  X 20         Crab walk  While seated on noodle in shallow - 4 laps      Lower abdominal   work  Knees to chest x 10  Knees to chest x 10         Cardio          Jogging        Lap   Swimming          Stretches On bench  Standing at wall        Hamstrings X 3 B X 3 B        Heelcords X 3 B X 3 B        Piriformis X 3 B         Neck                        Treatment/Session Assessment:  Patient worked hard today. Focus today on core stability, strength, and posture/ alignment. She requires verbal cues for proper alignment with activities throughout due to loosing her posture or as she states gets lazy. Patient more focused today. No pain before or after therapy session. Did assist patient to changing room at end of session with CGA due to balance as exiting to pool today. Patients response to [facility] treatment session was tolerated well with no medical complications. .  · Post session pain:  NO CHANGE IN PAIN  · Compliance with Program/Exercises: Will assess as treatment progresses. · Recommendations/Intent for next treatment session: \"Next visit will focus on full range, coordiantion, balance and gait exercises. \".   Total Treatment Duration:  PT Patient Time In/Time Out  Time In: 0805 (patient 5 minutes late)  Time Out: 0900    Vee Minor PTA

## 2018-01-30 ENCOUNTER — HOSPITAL ENCOUNTER (OUTPATIENT)
Dept: PHYSICAL THERAPY | Age: 23
Discharge: HOME | End: 2018-01-30
Attending: PSYCHIATRY & NEUROLOGY
Payer: COMMERCIAL

## 2018-01-30 ENCOUNTER — HOSPITAL ENCOUNTER (OUTPATIENT)
Dept: MRI IMAGING | Age: 23
Discharge: HOME | End: 2018-01-30
Attending: PSYCHIATRY & NEUROLOGY
Payer: COMMERCIAL

## 2018-01-30 VITALS — WEIGHT: 156 LBS | BODY MASS INDEX: 24.43 KG/M2

## 2018-01-30 DIAGNOSIS — G35 MULTIPLE SCLEROSIS (HCC): ICD-10-CM

## 2018-01-30 PROCEDURE — 74011250636 HC RX REV CODE- 250/636: Performed by: PSYCHIATRY & NEUROLOGY

## 2018-01-30 PROCEDURE — 70553 MRI BRAIN STEM W/O & W/DYE: CPT

## 2018-01-30 PROCEDURE — 97530 THERAPEUTIC ACTIVITIES: CPT

## 2018-01-30 PROCEDURE — A9577 INJ MULTIHANCE: HCPCS | Performed by: PSYCHIATRY & NEUROLOGY

## 2018-01-30 RX ORDER — SODIUM CHLORIDE 0.9 % (FLUSH) 0.9 %
10 SYRINGE (ML) INJECTION
Status: COMPLETED | OUTPATIENT
Start: 2018-01-30 | End: 2018-01-30

## 2018-01-30 RX ADMIN — GADOBENATE DIMEGLUMINE 14 ML: 529 INJECTION, SOLUTION INTRAVENOUS at 19:34

## 2018-01-30 RX ADMIN — Medication 10 ML: at 19:34

## 2018-01-30 NOTE — PROGRESS NOTES
Sushant Simon  : 1995  Primary: Jose Sexton  Secondary:  [address], [place], [address] [facility]  [phone]   [phone]        OUTPATIENT PHYSICAL THERAPY:Daily Note 2018    ICD-10: Treatment Diagnosis: Muscle weakness (generalized) (M62.81); Other abnormalities of gait and mobility (R26.89); Other disturbances of skin sensation (R20.8)  Precautions/Allergies:   Review of patient's allergies indicates no known allergies. Fall Risk Score: 6 (? 5 = High Risk)  MD Orders: PT eval and mgt MEDICAL/REFERRING DIAGNOSIS:  Multiple sclerosis [G35]   DATE OF ONSET:   REFERRING PHYSICIAN: Mela Dobbins MD  RETURN PHYSICIAN APPOINTMENT: unknown  DATE OF PROGRESS NOTE:    RECERTIFICATION DATE: 4/3/32     INITIAL ASSESSMENT:  Ms. Arnold Simon presents with weakness, sensory loss, incoordination, balance and gait deficit in the presence of an MS diagnosis. Pt will benefit from physical therapy/acquatics to maximize functional ability including any equipments needs assessment. Pt would benefit from  consult for community assistance needs. Pt may benefit from hand controls for driving to further her independence. PROBLEM LIST (Impacting functional limitations):  1. Decreased Strength  2. Decreased ADL/Functional Activities  3. Decreased Transfer Abilities  4. Decreased Ambulation Ability/Technique  5. Decreased Balance  6. Increased Pain  7. Decreased Activity Tolerance  8. Increased Fatigue  9. Decreased Flexibility/Joint Mobility  10. Decreased [place] with Home Exercise Program INTERVENTIONS PLANNED:  1. Balance Exercise  2. Bed Mobility  3. Electrical Stimulation  4. Family Education  5. Gait Training  6. Home Exercise Program (HEP)  7. Manual Therapy  8. Neuromuscular Re-education/Strengthening  9. Range of Motion (ROM)  10. Therapeutic Activites  11.  Therapeutic Exercise/Strengthening  12. Transfer Training  13. orthotic consult   14. W/c assessment when needed   TREATMENT PLAN:  Effective Dates: 1/9/18 TO 4/6/18. Frequency/Duration: 2 times a week for 12 weeks  GOALS: (Goals have been discussed and agreed upon with patient.)  Short-Term Functional Goals: Time Frame: 4 weeks  1. Pt will be independent with range of motion, strength and balance home exercise program.  2. Pt will be able to stand x 2 minutes with good posture - not locking knees in extension entire time. 3. Pt will decrease TUG time x 2 seconds indicating increased strength, balance and gait ability. Discharge Goals: Time Frame: 12 weeks  Pt will show increased range of motion, strength and improved posture to allow for improved safety and ability with all functional mobility. Pt will decrease TUG score by 2 more seconds indicating more normalized gait pattern and decrease risk for falls. Pt will increase Moraes Balance Scale to 46/56 indicating increased balance and decreased risk for falls. Pt will be able to ambulate independent and safe with least restrictive device to test for 6 minute walk test.    Rehabilitation Potential For Stated Goals: Good  Regarding Jeanie Donis's therapy, I certify that the treatment plan above will be carried out by a therapist or under their direction. Thank you for this referral,  Deedee Escobar, PT               HISTORY:   GOAL:  \"I want to be able to run. I have tired to run when this first happened. I can do a plank. Be able to walk without assistance. Present Symptoms:    23year old right handed black female diagnosed with MS 11/2015. Noticed abnormalities since 2004. Sensitive to heat and cold. Still getting used to the weather and how it affects it. When it is rainy and really cold I feel like bad rusted wire mostly in my legs. I don't get too much discofmort in my arms. While dealing with this I am online to go to school.   Studying  [employer]. Started 2004 feeling bad and have not worked since then. I would work out and then my workout were not coming along like going up the steps was my first initial clue that something was wrong. Cant pick stuff up from the floor especially on steps. Lives with mom Sami Taylor - dresssing . Use a shower chair and then is independent. Henry Juniper and has cane. Has a wheelchair. Furniture or wall walk at home. Wheelchair walker or hold someone when out. About 3 falls per month. Pt, mom, sister and nephew. Boyfriend Clarissa Zazueta. Helps patient - dating  About 1 year. Mom gone during the day. Microwave food. Decreased memory. Dont really cook on stove anymore because I forgot it one time  Daily routine - make oatmeal, check out classroom and a little work. Pain in my legs or joints get some stinging and cant sleep    Do steps - like go up and down 5 times. PAIN:  Pain with exhuastion or cold or relapse and puts her in the bed.  5/10 overall  Has not told MD about pain. Has had a 1/2 Lortab before and that helped. Baclofen was no good. Cold weather is a set back but mobility is about where it has been. History of Present Injury/Illness (Reason for Referral):    Past Medical History/Comorbidities:   Ms. Stephanie Arita  has a past medical history of Herpes genitalia; HSV (herpes simplex virus) anogenital infection (12/22/2015); and MS (multiple sclerosis) ([facility] [address] Ms. Stephanie Arita  has no past surgical history on file. Social History/Living Environment:     Two story home. Room upstairs - Hold banister and wall for steps. Prior Level of Function/Work/Activity:  2004 not working. Current Medications:    Current Outpatient Prescriptions:     gabapentin (NEURONTIN) 100 mg capsule, Take 1 Cap by mouth four (4) times daily. , Disp: 120 Cap, Rfl: 2    valACYclovir (VALTREX) 1 gram tablet, Take 1 Tab by mouth daily. , Disp: 30 Tab, Rfl: 11    Cholecalciferol, Vitamin D3, 3,000 unit tab, Take  by mouth., Disp: , Rfl:     VITAMIN B COMPLEX PO, Take  by mouth., Disp: , Rfl:     VITAMIN E ACETATE PO, Take  by mouth., Disp: , Rfl:     Wheel Chair cesar, Daily use G35 & R 48.2, Disp: 1 Each, Rfl: 0    AMPYRA 10 mg Tb12, , Disp: , Rfl:     desogestrel-ethinyl estradiol (ENSKYCE) 0.15-0.03 mg tab, Take 1 Tab by mouth daily. , Disp: 3 Dose Pack, Rfl: 4    DISABLED PLACARD (DISABLED PLACARD) DMV, Permanent Handicap Placard DX: Multiple Sclerosis G35, Disp: 1 Each, Rfl: 0   Date Last Reviewed:  2018   Number of Personal Factors/Comorbidities that affect the Plan of Care: 1-2: MODERATE COMPLEXITY   EXAMINATION:   ROM:  UE:  Good flexible. Light touch present through out LEs, but delayed responses. UE good. Decreased proprioception noted with eyes closed  Strength:          UE:  4+ to 5/5. Core fair static - dynamic could not reach and leave LORI  Left LE:    Hip flexion:  occ 3/5 but usually 3-/5. About 1/2 range  Quickly fatigues  Knee ext: 5/5  Hamstrin/5  DF:  Slight decreased range:  3+/5 EV/INV: 5/5  Toes:  1/2 range for extension  Fairly strong in PF    Right:  Hip flexion 1/4 range in sitting  Knee ext: 5/5  Hams:  4/5  DF:  1/2 range  EV:  1/2 range  INV:  5/5  Toes as above  Functional Mobility:         Gait/Ambulation:  Pt typically walks with walls and furniture or someone helping her or her walker. If the distance is longer she uses the wheelchair. Today without assistive device with close supervision and the wall nearby pt ambulates at least 100' with crouched ataxic gait with scissoring, path deviations and arms in mid guard reaching out to catch a fall. Decreased WS in stance over either foot. Transfers:  Independent with use of hands        Bed Mobility:  independent  Mental Status:          Alert and oriented x 4. Self reported forgetfulness. Slow responses with mildly perceptible slur. Vision:          Pt states she has no deficits. Does get dizzy with turns.    Body Structures Involved:  1. Nerves  2. Eyes and Ears  3. Voice/Speech  4. Bones  5. Joints  6. Muscles  7. Ligaments Body Functions Affected:  1. Mental  2. Sensory/Pain  3. Voice and Speech  4. Neuromusculoskeletal  5. Movement Related Activities and Participation Affected:  1. Learning and Applying Knowledge  2. General Tasks and Demands  3. Communication  4. Mobility  5. Self Care  6. Domestic Life  7. Interpersonal Interactions and Relationships  8. Community, Social and [place]   Number of elements that affect the Plan of Care: 4+: HIGH COMPLEXITY   CLINICAL PRESENTATION:   Presentation: Evolving clinical presentation with changing clinical characteristics: MODERATE COMPLEXITY   CLINICAL DECISION MAKING:   Outcome Measure: Tool Used: Moraes Balance Scale  Score:  Initial: 35/56 Most Recent: X/56 (Date: -- )   Interpretation of Score: Each section is scored on a 0-4 scale, 0 representing the patients inability to perform the task and 4 representing independence. The scores of each section are added together for a total score of 56. The higher the patients score, the more independent the patient is. Any score below 45 indicates increased risk for falls. Score 56 55-45 44-34 33-23 22-12 11-1 0   Modifier CH CI CJ CK CL CM CN     Tool Used: Timed Up and Go (TUG)  Score:  Initial: 16.45 seconds Most Recent: X seconds (Date: -- )   Interpretation of Score: The test measures, in seconds, the time taken by an individual to stand up from a standard arm chair (seat height 46 cm [18 in], arm height 65 cm [25.6 in]), walk a distance of 3 meters (118 in, approx 10 ft), turn, walk back to the chair and sit down. If the individual takes longer than 14 seconds to complete TUG, this indicates risk for falls.   Score 7 7.5-10.5 11-14 14.5-17.5 18-21 21.5-24.5 25+   Modifier CH CI CJ CK CL CM CN     Medical Necessity:   · Skilled intervention continues to be required due to weakness, incoordination, balance and gait deficit. .  Reason for Services/Other Comments:  · Patient continues to require skilled intervention due to weakness, incoordination, balance and gait deficit. .   Use of outcome tool(s) and clinical judgement create a POC that gives a: Questionable prediction of patient's progress: MODERATE COMPLEXITY   S:  Sorry for being tardy. Just a lot going on this morning. No, no pain, just sore. Took an [place]. We live in a two story and I go up and down a lot. Oh yeah, the pool was great. I could really feel it engaging my core and all. TREATMENT:   (In addition to Assessment/Re-Assessment sessions the following treatments were rendered)    THERAPEUTIC EXERCISE: (45 minutes):  Exercises per grid below to improve mobility, strength, balance and coordination. Required minimal visual, verbal, manual and tactile cues to promote proper body alignment, promote proper body posture and promote proper body breathing techniques. Progressed resistance, range, repetitions and complexity of movement as indicated. Date:  1/30/18   Date:   Activity/Exercise Parameters Parameters   Nu Step  Level 3   10 minutes. Bent knee bridge x10 with some decreased coordination. Will work to 3 second holds    Supine bent knee hip IR/ER with yellow band x10    Prone plank Very difficult. Excess lordosis/hypermobility in low back. Difficulty getting toe under her. Small clearance with continued lordosis         EDUCATION:  Instructed in above exercises and home program.  HEP:  Written HEP given to patient. Treatment/Session Assessment:  Pt was about 18 minutes late. Kept pt over. Very poor recall of appointments and times missed. Slow responses with slurring. Follows all directions and works hard in session. Increased control of gait upon walking out vs. Rag-doll type gait upon entering. Patients response to todays treatment session was tolerated well with no medical complications. .  · Post session pain:  NO CHANGE IN PAIN  · Compliance with Program/Exercises: Will assess as treatment progresses. · Recommendations/Intent for next treatment session: \"Next visit will focus on full range, coordiantion, balance and gait exercises. \".   Total Treatment Duration:  PT Patient Time In/Time Out  Time In: 0948  Time Out: [address] [place], [place]

## 2018-02-01 ENCOUNTER — HOSPITAL ENCOUNTER (OUTPATIENT)
Dept: PHYSICAL THERAPY | Age: 23
Discharge: HOME | End: 2018-02-01
Attending: PSYCHIATRY & NEUROLOGY
Payer: COMMERCIAL

## 2018-02-01 PROCEDURE — 97113 AQUATIC THERAPY/EXERCISES: CPT

## 2018-02-01 NOTE — PROGRESS NOTES
Sushant Simon  : 1995  Primary: Jose Sexton  Secondary:  [address] [place]  at [facility]  [address], [address], [place] [address] [address] [facility]  [phone]   [phone]        OUTPATIENT PHYSICAL THERAPY:Daily Note and Aquatic Note 2018    ICD-10: Treatment Diagnosis: Muscle weakness (generalized) (M62.81); Other abnormalities of gait and mobility (R26.89); Other disturbances of skin sensation (R20.8)  Precautions/Allergies:   Review of patient's allergies indicates no known allergies. Fall Risk Score: 6 (? 5 = High Risk)  MD Orders: PT eval and mgt MEDICAL/REFERRING DIAGNOSIS:  Multiple sclerosis [G35]   DATE OF ONSET:   REFERRING PHYSICIAN: Mela Dobbins MD  RETURN PHYSICIAN APPOINTMENT: unknown  DATE OF PROGRESS NOTE:  54  RECERTIFICATION DATE: 39     INITIAL ASSESSMENT:  Ms. Arnold Simon presents with weakness, sensory loss, incoordination, balance and gait deficit in the presence of an MS diagnosis. Pt will benefit from physical therapy/acquatics to maximize functional ability including any equipments needs assessment. Pt would benefit from  consult for community assistance needs. Pt may benefit from hand controls for driving to further her independence. PROBLEM LIST (Impacting functional limitations):  1. Decreased Strength  2. Decreased ADL/Functional Activities  3. Decreased Transfer Abilities  4. Decreased Ambulation Ability/Technique  5. Decreased Balance  6. Increased Pain  7. Decreased Activity Tolerance  8. Increased Fatigue  9. Decreased Flexibility/Joint Mobility  10. Decreased [place] with Home Exercise Program INTERVENTIONS PLANNED:  1. Balance Exercise  2. Bed Mobility  3. Electrical Stimulation  4. Family Education  5. Gait Training  6. Home Exercise Program (HEP)  7. Manual Therapy  8. Neuromuscular Re-education/Strengthening  9. Range of Motion (ROM)  10. Therapeutic Activites  11.  Therapeutic Exercise/Strengthening  12. Transfer Training  13. orthotic consult   14. W/c assessment when needed   TREATMENT PLAN:  Effective Dates: 1/9/18 TO 4/6/18. Frequency/Duration: 2 times a week for 12 weeks  GOALS: (Goals have been discussed and agreed upon with patient.)  Short-Term Functional Goals: Time Frame: 4 weeks  1. Pt will be independent with range of motion, strength and balance home exercise program.  2. Pt will be able to stand x 2 minutes with good posture - not locking knees in extension entire time. 3. Pt will decrease TUG time x 2 seconds indicating increased strength, balance and gait ability. Discharge Goals: Time Frame: 12 weeks  Pt will show increased range of motion, strength and improved posture to allow for improved safety and ability with all functional mobility. Pt will decrease TUG score by 2 more seconds indicating more normalized gait pattern and decrease risk for falls. Pt will increase Moraes Balance Scale to 46/56 indicating increased balance and decreased risk for falls. Pt will be able to ambulate independent and safe with least restrictive device to test for 6 minute walk test.    Rehabilitation Potential For Stated Goals: Good  Regarding Jeanie Donis's therapy, I certify that the treatment plan above will be carried out by a therapist or under their direction. Thank you for this referral,  Leann Fletcher PTA     Referring Physician Signature: Kathryn Ibanez MD              Date                    HISTORY:   GOAL:  \"I want to be able to run. I have tired to run when this first happened. I can do a plank. Be able to walk without assistance. Present Symptoms:    23year old right handed black female diagnosed with MS 11/2015. Noticed abnormalities since 2004. Sensitive to heat and cold. Still getting used to the weather and how it affects it. When it is rainy and really cold I feel like bad rusted wire mostly in my legs.  I don't get too much discofmort in my arms. While dealing with this I am online to go to school. Studying  [employer]. Started 2004 feeling bad and have not worked since then. I would work out and then my workout were not coming along like going up the steps was my first initial clue that something was wrong. Cant pick stuff up from the floor especially on steps. Lives with mom Liz Upton - dresssing . Use a shower chair and then is independent. Jerri Félix and has cane. Has a wheelchair. Furniture or wall walk at home. Wheelchair walker or hold someone when out. About 3 falls per month. Pt, mom, sister and nephew. Boyfriend Emmie Orozco. Helps patient - dating  About 1 year. Mom gone during the day. Microwave food. Decreased memory. Dont really cook on stove anymore because I forgot it one time  Daily routine - make oatmeal, check out classroom and a little work. Pain in my legs or joints get some stinging and cant sleep    Do steps - like go up and down 5 times. PAIN:  Pain with exhuastion or cold or relapse and puts her in the bed.  5/10 overall  Has not told MD about pain. Has had a 1/2 Lortab before and that helped. Baclofen was no good. Cold weather is a set back but mobility is about where it has been. History of Present Injury/Illness (Reason for Referral):    Past Medical History/Comorbidities:   Ms. Ale Lewis  has a past medical history of Herpes genitalia; HSV (herpes simplex virus) anogenital infection (12/22/2015); and [facility] Ms. Ale Lewis  has no past surgical history on file. Social History/Living Environment:     Two story home. Room upstairs - Hold banister and wall for steps. Prior Level of Function/Work/Activity:  2004 not working. Current Medications:    Current Outpatient Prescriptions:     gabapentin (NEURONTIN) 100 mg capsule, Take 1 Cap by mouth four (4) times daily. , Disp: 120 Cap, Rfl: 2    valACYclovir (VALTREX) 1 gram tablet, Take 1 Tab by mouth daily. , Disp: 30 Tab, Rfl: 11    Cholecalciferol, Vitamin D3, 3,000 unit tab, Take  by mouth., Disp: , Rfl:     VITAMIN B COMPLEX PO, Take  by mouth., Disp: , Rfl:     VITAMIN E ACETATE PO, Take  by mouth., Disp: , Rfl:     Wheel Chair cesar, Daily use G35 & R 48.2, Disp: 1 Each, Rfl: 0    AMPYRA 10 mg Tb12, , Disp: , Rfl:     desogestrel-ethinyl estradiol (ENSKYCE) 0.15-0.03 mg tab, Take 1 Tab by mouth daily. , Disp: 3 Dose Pack, Rfl: 4    DISABLED PLACARD (DISABLED PLACARD) DMV, Permanent Handicap Placard DX: Multiple Sclerosis G35, Disp: 1 Each, Rfl: 0   Date Last Reviewed:  2018   Number of Personal Factors/Comorbidities that affect the Plan of Care: 1-2: MODERATE COMPLEXITY   EXAMINATION:   ROM:  UE:  Good flexible. Light touch present through out LEs, but delayed responses. UE good. Decreased proprioception noted with eyes closed  Strength:          UE:  4+ to 5/5. Core fair static - dynamic could not reach and leave LORI  Left LE:    Hip flexion:  occ 3/5 but usually 3-/5. About 1/2 range  Quickly fatigues  Knee ext: 5/5  Hamstrin/5  DF:  Slight decreased range:  3+/5 EV/INV: 5/5  Toes:  1/2 range for extension  Fairly strong in PF    Right:  Hip flexion 1/4 range in sitting  Knee ext: 5/5  Hams:  4/5  DF:  1/2 range  EV:  1/2 range  INV:  5/5  Toes as above  Functional Mobility:         Gait/Ambulation:  Pt typically walks with walls and furniture or someone helping her or her walker. If the distance is longer she uses the wheelchair. Today without assistive device with close supervision and the wall nearby pt ambulates at least 100' with crouched ataxic gait with scissoring, path deviations and arms in mid guard reaching out to catch a fall. Decreased WS in stance over either foot. Transfers:  Independent with use of hands        Bed Mobility:  independent  Mental Status:          Alert and oriented x 4. Self reported forgetfulness. Slow responses with mildly perceptible slur.     Vision:          Pt states she has no deficits. Does get dizzy with turns. Body Structures Involved:  1. Nerves  2. Eyes and Ears  3. Voice/Speech  4. Bones  5. Joints  6. Muscles  7. Ligaments Body Functions Affected:  1. Mental  2. Sensory/Pain  3. Voice and Speech  4. Neuromusculoskeletal  5. Movement Related Activities and Participation Affected:  1. Learning and Applying Knowledge  2. General Tasks and Demands  3. Communication  4. Mobility  5. Self Care  6. Domestic Life  7. Interpersonal Interactions and Relationships  8. Community, Social and [place]   Number of elements that affect the Plan of Care: 4+: HIGH COMPLEXITY   CLINICAL PRESENTATION:   Presentation: Evolving clinical presentation with changing clinical characteristics: MODERATE COMPLEXITY   CLINICAL DECISION MAKING:   Outcome Measure: Tool Used: Moraes Balance Scale  Score:  Initial: 35/56 Most Recent: X/56 (Date: -- )   Interpretation of Score: Each section is scored on a 0-4 scale, 0 representing the patients inability to perform the task and 4 representing independence. The scores of each section are added together for a total score of 56. The higher the patients score, the more independent the patient is. Any score below 45 indicates increased risk for falls. Score 56 55-45 44-34 33-23 22-12 11-1 0   Modifier CH CI CJ CK CL CM CN     Tool Used: Timed Up and Go (TUG)  Score:  Initial: 16.45 seconds Most Recent: X seconds (Date: -- )   Interpretation of Score: The test measures, in seconds, the time taken by an individual to stand up from a standard arm chair (seat height 46 cm [18 in], arm height 65 cm [25.6 in]), walk a distance of 3 meters (118 in, approx 10 ft), turn, walk back to the chair and sit down. If the individual takes longer than 14 seconds to complete TUG, this indicates risk for falls.   Score 7 7.5-10.5 11-14 14.5-17.5 18-21 21.5-24.5 25+   Modifier CH CI CJ CK CL CM CN     Medical Necessity:   · Skilled intervention continues to be required due to weakness, incoordination, balance and gait deficit. .  Reason for Services/Other Comments:  · Patient continues to require skilled intervention due to weakness, incoordination, balance and gait deficit. .   Use of outcome tool(s) and clinical judgement create a POC that gives a: Questionable prediction of patient's progress: MODERATE COMPLEXITY   TREATMENT:  S: Patient reports feeling okay today. She reports they changed her medicines a bit. She reports pain to be 1.5/10. Aquatic Therapy (45 minutes): Aquatic treatment performed per flow grid for Decreased muscle strength, Decreased endurance and Decreased static/dynamic balance and reactive control. Cues provided for balance. Assistance by therapist provided for balance and safety.      Aquatic Exercise Log       Date  1-16-18 Date  1-23-18 Date  2-1-18 Date   Date     Activity/ Exercise Parameters Parameters Parameters Parameters Parameters   Walking forward 4 laps with rail  4 laps with rail 4 laps with rail     Walking backward 4 laps with rail 4 laps with rail 4 laps with rail     Walking sideways 4 laps with rail 4 laps with rail 4 laps with rail       Marching 2 laps with rail and HHA 4 laps with rail  4 laps with rail       Goose Step 2 laps with rail and HHA 2 laps with rail  2 laps with rail       Tip toes 2 laps with rail and HHA         Heels          Lunges        Side step squats        LE Exercises No noodle  Small noodle        Hip Flex/Ext X 10 B  Marching x 10 B       Hip Abd/Add X 10 B  X 10 B       Hip IR/ER          Calf raises X 10 B         Knee Flex X 10 B         Squats  With bungee belt pulling in all directions x 10 with emphasis on core and stability  (light pull)        Leg Circles Deeper water x 10 each way each leg         Step Ups  X 10 small step B   X 5 big step B  Big step   X 10 B     UE Exercises          Squeeze In          Push Down          Pull Down          Bicep/Tricep        Rows/Press outs  Chi Positions          Trunk Rotation        Deep H2O/ Noodles rings rings Rings and noodle        Stabilization  Core stability        Arms only   Seated on noodle  In shallow - 4 laps        Legs only Bicycle - 2 laps Bicycle -   Also seated on noodle in shallow - 4 laps forward and backwardss Bicycle in deep  Seated in shallow - 4 laps     Both - 4 laps      [place] X 20  X 20  2 x 20        Scissors X 20   Combo x 10  X 20  2 x 20        Crab walk  While seated on noodle in shallow - 4 laps      Lower abdominal   work  Knees to chest x 10  Knees to chest x 10  Knees to chest x 10        Cardio          Jogging        Lap   Swimming          Stretches On bench  Standing at wall Bench        Hamstrings X 3 B X 3 B X 3 B       Heelcords X 3 B X 3 B X 3 B       Piriformis X 3 B         Neck                        Treatment/Session Assessment:  Patient required a few extra breaks or rest times in between exercises today. Focus still on core stability, strength, and posture/ alignment. She required less verbal cues for proper alignment with activities throughout due to loosing her posture. Patient reports no pain til she was hit by the cool air getting out which she stated had her joints lock up. She required assistance to locker room to call and get her mother to assist her with changing. Patients response to todays treatment session was tolerated well with no medical complications. .  · Post session pain:  NO CHANGE IN PAIN  · Compliance with Program/Exercises: Will assess as treatment progresses. · Recommendations/Intent for next treatment session: \"Next visit will focus on full range, coordiantion, balance and gait exercises. \".   Total Treatment Duration:  PT Patient Time In/Time Out  Time In: 0815 (15 minutes late)  Time Out: 0900    Kinsey Smith PTA

## 2018-02-06 ENCOUNTER — HOSPITAL ENCOUNTER (OUTPATIENT)
Dept: PHYSICAL THERAPY | Age: 23
Discharge: HOME | End: 2018-02-06
Attending: PSYCHIATRY & NEUROLOGY
Payer: COMMERCIAL

## 2018-02-06 NOTE — PROGRESS NOTES
Jimmie Alexandra  : 1995  Primary: [employer] [place]  Secondary:  [address]  at [facility], [address] [facility] [place], [address]  [phone]   [phone]      2018        Patient had called to cancel this appointment due to not being able to make appointment. Will follow up with the patient at the next visit.   Rosendo Childs, PTA

## 2018-02-08 ENCOUNTER — HOSPITAL ENCOUNTER (OUTPATIENT)
Dept: PHYSICAL THERAPY | Age: 23
Discharge: HOME | End: 2018-02-08
Attending: PSYCHIATRY & NEUROLOGY
Payer: COMMERCIAL

## 2018-02-08 PROCEDURE — 97110 THERAPEUTIC EXERCISES: CPT

## 2018-02-08 NOTE — PROGRESS NOTES
Santy Kimball  : 1995  Primary: [facility]  Secondary:  [address] at [facility]  [address], [address] [place] [address] Drive, [place], [facility]  [phone]   [phone]        OUTPATIENT PHYSICAL THERAPY:Daily Note 2018    ICD-10: Treatment Diagnosis: Muscle weakness (generalized) (M62.81); Other abnormalities of gait and mobility (R26.89); Other disturbances of skin sensation (R20.8)  Precautions/Allergies:   Review of patient's allergies indicates no known allergies. Fall Risk Score: 6 (? 5 = High Risk)  MD Orders: PT eval and mgt MEDICAL/REFERRING DIAGNOSIS:  Multiple sclerosis [G35]   DATE OF ONSET:   REFERRING PHYSICIAN: Milton Bonner MD  RETURN PHYSICIAN APPOINTMENT: unknown  DATE OF PROGRESS NOTE:    RECERTIFICATION DATE: 87     INITIAL ASSESSMENT:  Ms. Shereen Guerrier presents with weakness, sensory loss, incoordination, balance and gait deficit in the presence of an MS diagnosis. Pt will benefit from physical therapy/acquatics to maximize functional ability including any equipments needs assessment. Pt would benefit from  consult for community assistance needs. Pt may benefit from hand controls for driving to further her independence. PROBLEM LIST (Impacting functional limitations):  1. Decreased Strength  2. Decreased ADL/Functional Activities  3. Decreased Transfer Abilities  4. Decreased Ambulation Ability/Technique  5. Decreased Balance  6. Increased Pain  7. Decreased Activity Tolerance  8. Increased Fatigue  9. Decreased Flexibility/Joint Mobility  10. Decreased Nunez with Home Exercise Program INTERVENTIONS PLANNED:  1. Balance Exercise  2. Bed Mobility  3. Electrical Stimulation  4. Family Education  5. Gait Training  6. Home Exercise Program (HEP)  7. Manual Therapy  8. Neuromuscular Re-education/Strengthening  9. Range of Motion (ROM)  10. Therapeutic Activites  11.  Therapeutic Exercise/Strengthening  12. Transfer Training  13. orthotic consult   14. W/c assessment when needed   TREATMENT PLAN:  Effective Dates: 1/9/18 TO 4/6/18. Frequency/Duration: 2 times a week for 12 weeks  GOALS: (Goals have been discussed and agreed upon with patient.)  Short-Term Functional Goals: Time Frame: 4 weeks  1. Pt will be independent with range of motion, strength and balance home exercise program.  2. Pt will be able to stand x 2 minutes with good posture - not locking knees in extension entire time. 3. Pt will decrease TUG time x 2 seconds indicating increased strength, balance and gait ability. Discharge Goals: Time Frame: 12 weeks  Pt will show increased range of motion, strength and improved posture to allow for improved safety and ability with all functional mobility. Pt will decrease TUG score by 2 more seconds indicating more normalized gait pattern and decrease risk for falls. Pt will increase Moraes Balance Scale to 46/56 indicating increased balance and decreased risk for falls. Pt will be able to ambulate independent and safe with least restrictive device to test for 6 minute walk test.    Rehabilitation Potential For Stated Goals: Good  Regarding Jeanie Donis's therapy, I certify that the treatment plan above will be carried out by a therapist or under their direction. Thank you for this referral,  Talia Coppola, PT               HISTORY:   GOAL:  \"I want to be able to run. I have tired to run when this first happened. I can do a plank. Be able to walk without assistance. Present Symptoms:    23year old right handed black female diagnosed with MS 11/2015. Noticed abnormalities since 2004. Sensitive to heat and cold. Still getting used to the weather and how it affects it. When it is rainy and really cold I feel like bad rusted wire mostly in my legs. I don't get too much discofmort in my arms. While dealing with this I am online to go to school.   Studying  [employer]. Started 2004 feeling bad and have not worked since then. I would work out and then my workout were not coming along like going up the steps was my first initial clue that something was wrong. Cant pick stuff up from the floor especially on steps. Lives with mom Laina Kraus - dresssing . Use a shower chair and then is independent. [place] Aloe and has cane. Has a wheelchair. Furniture or wall walk at home. Wheelchair walker or hold someone when out. About 3 falls per month. Pt, mom, sister and nephew. Boyfriend Kaylee Lanier. Helps patient - dating  About 1 year. Mom gone during the day. Microwave food. Decreased memory. Dont really cook on stove anymore because I forgot it one time  Daily routine - make oatmeal, check out classroom and a little work. Pain in my legs or joints get some stinging and cant sleep    Do steps - like go up and down 5 times. PAIN:  Pain with exhuastion or cold or relapse and puts her in the bed.  5/10 overall  Has not told MD about pain. Has had a 1/2 Lortab before and that helped. Baclofen was no good. Cold weather is a set back but mobility is about where it has been. History of Present Injury/Illness (Reason for Referral):    Past Medical History/Comorbidities:   Ms. Ilir Mckeon  has a past medical history of Herpes genitalia; HSV (herpes simplex virus) anogenital infection (12/22/2015); and MS (multiple sclerosis) ([facility] [address] Ms. Ilir Mckeon  has no past surgical history on file. Social History/Living Environment:     Two story home. Room upstairs - Hold banister and wall for steps. Prior Level of Function/Work/Activity:  2004 not working. Current Medications:    Current Outpatient Prescriptions:     gabapentin (NEURONTIN) 100 mg capsule, Take 1 Cap by mouth four (4) times daily. , Disp: 120 Cap, Rfl: 2  DOES NOT WANT TO TAKE. DID NOT FILL  TOO MANY SIDE EFFECTS.  (2/8/18)     valACYclovir (VALTREX) 1 gram tablet, Take 1 Tab by mouth daily. , Disp: 30 Tab, Rfl: 11    Cholecalciferol, Vitamin D3, 3,000 unit tab, Take  by mouth., Disp: , Rfl:     VITAMIN B COMPLEX PO, Take  by mouth., Disp: , Rfl:     VITAMIN E ACETATE PO, Take  by mouth., Disp: , Rfl:     Wheel Chair cesar, Daily use G35 & R 48.2, Disp: 1 Each, Rfl: 0    AMPYRA 10 mg Tb12, , Disp: , Rfl: STARTED TAKING AGAIN AFTER BEING OFF FOR TWO OR MORE WEEKS.   desogestrel-ethinyl estradiol (ENSKYCE) 0.15-0.03 mg tab, Take 1 Tab by mouth daily. , Disp: 3 Dose Pack, Rfl: 4    DISABLED PLACARD (DISABLED PLACARD) DMV, Permanent Handicap Placard DX: Multiple Sclerosis G35, Disp: 1 Each, Rfl: 0   Date Last Reviewed:  2018   Number of Personal Factors/Comorbidities that affect the Plan of Care: 1-2: MODERATE COMPLEXITY   EXAMINATION:   ROM:  UE:  Good flexible. Light touch present through out LEs, but delayed responses. UE good. Decreased proprioception noted with eyes closed  Strength:          UE:  4+ to 5/5. Core fair static - dynamic could not reach and leave LORI  Left LE:    Hip flexion:  occ 3/5 but usually 3-/5. About 1/2 range  Quickly fatigues  Knee ext: 5/5  Hamstrin/5  DF:  Slight decreased range:  3+/5 EV/INV: 5/5  Toes:  1/2 range for extension  Fairly strong in PF    Right:  Hip flexion 1/4 range in sitting  Knee ext: 5/5  Hams:  4/5  DF:  1/2 range  EV:  1/2 range  INV:  5/5  Toes as above  Functional Mobility:         Gait/Ambulation:  Pt typically walks with walls and furniture or someone helping her or her walker. If the distance is longer she uses the wheelchair. Today without assistive device with close supervision and the wall nearby pt ambulates at least 100' with crouched ataxic gait with scissoring, path deviations and arms in mid guard reaching out to catch a fall. Decreased WS in stance over either foot. Transfers:  Independent with use of hands        Bed Mobility:  independent  Mental Status:          Alert and oriented x 4. Self reported forgetfulness.   Slow responses with mildly perceptible slur. Vision:          Pt states she has no deficits. Does get dizzy with turns. Body Structures Involved:  1. Nerves  2. Eyes and Ears  3. Voice/Speech  4. Bones  5. Joints  6. Muscles  7. Ligaments Body Functions Affected:  1. Mental  2. Sensory/Pain  3. Voice and Speech  4. Neuromusculoskeletal  5. Movement Related Activities and Participation Affected:  1. Learning and Applying Knowledge  2. General Tasks and Demands  3. Communication  4. Mobility  5. Self Care  6. Domestic Life  7. Interpersonal Interactions and Relationships  8. Community, Social and [place]   Number of elements that affect the Plan of Care: 4+: HIGH COMPLEXITY   CLINICAL PRESENTATION:   Presentation: Evolving clinical presentation with changing clinical characteristics: MODERATE COMPLEXITY   CLINICAL DECISION MAKING:   Outcome Measure: Tool Used: Moraes Balance Scale  Score:  Initial: 35/56 Most Recent: X/56 (Date: -- )   Interpretation of Score: Each section is scored on a 0-4 scale, 0 representing the patients inability to perform the task and 4 representing independence. The scores of each section are added together for a total score of 56. The higher the patients score, the more independent the patient is. Any score below 45 indicates increased risk for falls. Score 56 55-45 44-34 33-23 22-12 11-1 0   Modifier CH CI CJ CK CL CM CN     Tool Used: Timed Up and Go (TUG)  Score:  Initial: 16.45 seconds Most Recent: X seconds (Date: -- )   Interpretation of Score: The test measures, in seconds, the time taken by an individual to stand up from a standard arm chair (seat height 46 cm [18 in], arm height 65 cm [25.6 in]), walk a distance of 3 meters (118 in, approx 10 ft), turn, walk back to the chair and sit down. If the individual takes longer than 14 seconds to complete TUG, this indicates risk for falls.   Score 7 7.5-10.5 11-14 14.5-17.5 18-21 21.5-24.5 25+   Modifier CH CI CJ CK CL CM CN     Medical Necessity:   · Skilled intervention continues to be required due to weakness, incoordination, balance and gait deficit. .  Reason for Services/Other Comments:  · Patient continues to require skilled intervention due to weakness, incoordination, balance and gait deficit. .   Use of outcome tool(s) and clinical judgement create a POC that gives a: Questionable prediction of patient's progress: MODERATE COMPLEXITY   S:  9:45 I am on week two like about 3 - 5 seconds. I had a bad experience with the pool. When the cold air hit me I just couldn't take that. It took me about 10 minutes to get to the locker room. Started back taking Ampyra. O:  15 minutes late. Steadier with gait. Eyes and speech clear. Walking in with soft boots on. Right boot with equinovalgus type wear. Encouraged more supportive shoes. See foot assessment below. TREATMENT:   (In addition to Assessment/Re-Assessment sessions the following treatments were rendered)    THERAPEUTIC EXERCISE: (45 minutes):  Exercises per grid below to improve mobility, strength, balance and coordination. Required minimal visual, verbal, manual and tactile cues to promote proper body alignment, promote proper body posture and promote proper body breathing techniques. Progressed resistance, range, repetitions and complexity of movement as indicated. Bilateral foot assessment:  Right foot arch collapse with mild eversion/valgus in stance. Both feet:  Dark round callous/plantar wart type area at about the 2nd MT heads on both feet. Pt states they are painful and that she had a podiatrist look at them and \"did not know what to do with them\". Pt is trying to remove them with something topical. Pain from these \"plantar warts\" and decreased sensory awareness has patient toe off through medial great toe vs. Across entire forefoot causing some valgus.      Date:  1/30/18   Date:  2/8/18   Activity/Exercise Parameters Parameters   Nu Step  Level 3 10 minutes. Level 3 x 9 minutes  Level 4 last minute \"I like that better\"  10 minutes. Bent knee bridge x10 with some decreased coordination. Will work to 3 second holds 10 x 3 seconds    Supine bent knee hip IR/ER with yellow band x10 Did not bring band  Performed x 20   Prone plank Very difficult. Excess lordosis/hypermobility in low back. Difficulty getting toe under her. Small clearance with continued lordosis Still too difficult. Unable to get toes under - discontinue for now. Prone to all four to regla pose and back down  x10 with good ability. Ankle "[employer]  x10 CW  x10 CCW both ankles   Long leg IN/EV \"windshield wipers/toe\"  kisses  X10. Cues to keeps legs still   Foot/ankle alphabet  All letters each foot   EDUCATION:  Instructed in above exercises and home program.  HEP:  Written HEP given to patient. Treatment/Session Assessment:  Pt was 15 minutes late again. Instructed in need to be on time. Pt states she is working on her mom/ride to be here on time. Pt is alert and walking better from the start of the session. This is the most steady and clear to date. Pt states she is back to taking her Ampyra which she states helps her feel more connected. Pt has always stated that she takes no narcotics and researches her meds and condition frequently. Pt seems to be overall better now with more compliance with the Ampyra. Pt will bilateral foot what appear to be plantar warts that contribute to her valgus right foot positioning and overall decreased toe off with either foot. Would benefit from assessment of these callouses/plantar warts. Pt will benefit from some type of orthotic device for pressure relief, foot positioning and mild foot drop. Will work on podiatry and orthotic consults. Patients response to todays treatment session was tolerated well with no medical complications. .  · Post session pain:  NO CHANGE IN PAIN  · Compliance with Program/Exercises:  Will assess as treatment progresses. · Recommendations/Intent for next treatment session: \"Next visit will focus on full range, coordiantion, balance and gait exercises. \".   Total Treatment Duration:  PT Patient Time In/Time Out  Time In: 0828  Time Out: 6565    Santiago Yao, PT

## 2019-01-18 ENCOUNTER — HOSPITAL ENCOUNTER (OUTPATIENT)
Dept: MRI IMAGING | Age: 24
Discharge: HOME | End: 2019-01-18
Attending: PSYCHIATRY & NEUROLOGY
Payer: COMMERCIAL

## 2019-01-18 DIAGNOSIS — G35 MS (MULTIPLE SCLEROSIS) (HCC): ICD-10-CM

## 2019-01-18 DIAGNOSIS — R48.2 GAIT APRAXIA: ICD-10-CM

## 2019-01-18 PROCEDURE — 70551 MRI BRAIN STEM W/O DYE: CPT

## 2019-03-01 PROBLEM — N39.41 URGE INCONTINENCE OF URINE: Status: ACTIVE | Noted: 2019-03-01

## 2019-07-12 PROBLEM — Z91.199 NON-COMPLIANCE WITH TREATMENT: Status: RESOLVED | Noted: 2017-12-29 | Resolved: 2019-07-12

## 2019-12-30 ENCOUNTER — HOSPITAL ENCOUNTER (OUTPATIENT)
Dept: MRI IMAGING | Age: 24
Discharge: HOME | End: 2019-12-30
Payer: COMMERCIAL

## 2019-12-30 DIAGNOSIS — G35 MULTIPLE SCLEROSIS (HCC): ICD-10-CM

## 2019-12-30 PROCEDURE — 70553 MRI BRAIN STEM W/O & W/DYE: CPT

## 2019-12-30 PROCEDURE — A9575 INJ GADOTERATE MEGLUMI 0.1ML: HCPCS

## 2019-12-30 PROCEDURE — 74011250636 HC RX REV CODE- 250/636

## 2019-12-30 PROCEDURE — 72156 MRI NECK SPINE W/O & W/DYE: CPT

## 2019-12-30 RX ORDER — SODIUM CHLORIDE 0.9 % (FLUSH) 0.9 %
10 SYRINGE (ML) INJECTION
Status: COMPLETED | OUTPATIENT
Start: 2019-12-30 | End: 2019-12-30

## 2019-12-30 RX ORDER — GADOTERATE MEGLUMINE 376.9 MG/ML
13 INJECTION INTRAVENOUS
Status: COMPLETED | OUTPATIENT
Start: 2019-12-30 | End: 2019-12-30

## 2019-12-30 RX ADMIN — Medication 10 ML: at 15:41

## 2019-12-30 RX ADMIN — GADOTERATE MEGLUMINE 13 ML: 376.9 INJECTION INTRAVENOUS at 15:41

## 2021-05-12 ENCOUNTER — TRANSCRIBE ORDER (OUTPATIENT)
Dept: SCHEDULING | Age: 26
End: 2021-05-12

## 2021-05-12 DIAGNOSIS — G35 MULTIPLE SCLEROSIS (HCC): Primary | ICD-10-CM

## 2021-06-03 ENCOUNTER — HOSPITAL ENCOUNTER (OUTPATIENT)
Dept: MRI IMAGING | Age: 26
Discharge: HOME | End: 2021-06-03
Attending: NURSE PRACTITIONER
Payer: COMMERCIAL

## 2021-06-03 DIAGNOSIS — G35 MULTIPLE SCLEROSIS (HCC): ICD-10-CM

## 2021-06-03 PROCEDURE — 70553 MRI BRAIN STEM W/O & W/DYE: CPT

## 2021-06-03 PROCEDURE — 74011636320 HC RX REV CODE- 636/320

## 2021-06-03 PROCEDURE — A9576 INJ PROHANCE MULTIPACK: HCPCS

## 2021-06-03 RX ORDER — SODIUM CHLORIDE 0.9 % (FLUSH) 0.9 %
10 SYRINGE (ML) INJECTION
Status: COMPLETED | OUTPATIENT
Start: 2021-06-03 | End: 2021-06-03

## 2021-06-03 RX ADMIN — GADOTERIDOL 13 ML: 279.3 INJECTION, SOLUTION INTRAVENOUS at 09:56

## 2021-06-03 RX ADMIN — Medication 10 ML: at 09:57

## 2022-03-03 ENCOUNTER — TRANSCRIBE ORDER (OUTPATIENT)
Dept: SCHEDULING | Age: 27
End: 2022-03-03

## 2022-03-03 DIAGNOSIS — G35 MULTIPLE SCLEROSIS (HCC): Primary | ICD-10-CM

## 2022-03-16 ENCOUNTER — HOSPITAL ENCOUNTER (OUTPATIENT)
Dept: MRI IMAGING | Age: 27
Discharge: HOME | End: 2022-03-16
Attending: PSYCHIATRY & NEUROLOGY
Payer: MEDICAID

## 2022-03-16 DIAGNOSIS — G35 MULTIPLE SCLEROSIS (HCC): ICD-10-CM

## 2022-03-16 PROCEDURE — 74011250636 HC RX REV CODE- 250/636

## 2022-03-16 PROCEDURE — 70553 MRI BRAIN STEM W/O & W/DYE: CPT

## 2022-03-16 PROCEDURE — A9576 INJ PROHANCE MULTIPACK: HCPCS

## 2022-03-16 RX ORDER — SODIUM CHLORIDE 0.9 % (FLUSH) 0.9 %
10 SYRINGE (ML) INJECTION
Status: COMPLETED | OUTPATIENT
Start: 2022-03-16 | End: 2022-03-16

## 2022-03-16 RX ADMIN — GADOTERIDOL 13 ML: 279.3 INJECTION, SOLUTION INTRAVENOUS at 17:11

## 2022-03-16 RX ADMIN — Medication 10 ML: at 17:11
